# Patient Record
(demographics unavailable — no encounter records)

---

## 2024-10-15 NOTE — HISTORY OF PRESENT ILLNESS
[de-identified] : 79 years old female has past medical history of hypertension, Type 2 Diabetes Mellitus with hyperglycemia, CVA, Mixed Hyperlipidemia, came back to review her most recent blood and urine tests results. Fasting glucose was 173, HBA1c 6.9, ALT 46, vitamin D 23.1, UA positive for protein and trace leukocyte esterase, and epithelial cells, and albumin/creatinine ratio 637. Reports were reviewed with pt in details. Other blood tests came back wnl. Pt has been taking all her medications.  called office in one week ago because pt's home blood pressure went up to 200/110. Pt started losartan 25 mg daily, then advanced to 25 mg, 2 daily, Pt has been taking losartan 25 mg, 2 daily, together with amlodipine 10 mg daily and propranolol 160 mg daily. Home blood pressure improved 147-150/70-80. Pt will take a flight soon, followed by a cruise. Pt requested Rx of alprazolam 0.5 mg daily as needed for anxiety/panic symptoms on the plane and cruise.

## 2024-10-15 NOTE — HISTORY OF PRESENT ILLNESS
[de-identified] : 79 years old female has past medical history of hypertension, Type 2 Diabetes Mellitus with hyperglycemia, CVA, Mixed Hyperlipidemia, came back to review her most recent blood and urine tests results. Fasting glucose was 173, HBA1c 6.9, ALT 46, vitamin D 23.1, UA positive for protein and trace leukocyte esterase, and epithelial cells, and albumin/creatinine ratio 637. Reports were reviewed with pt in details. Other blood tests came back wnl. Pt has been taking all her medications.  called office in one week ago because pt's home blood pressure went up to 200/110. Pt started losartan 25 mg daily, then advanced to 25 mg, 2 daily, Pt has been taking losartan 25 mg, 2 daily, together with amlodipine 10 mg daily and propranolol 160 mg daily. Home blood pressure improved 147-150/70-80. Pt will take a flight soon, followed by a cruise. Pt requested Rx of alprazolam 0.5 mg daily as needed for anxiety/panic symptoms on the plane and cruise.

## 2025-01-07 NOTE — PHYSICAL EXAM
[No Acute Distress] : no acute distress [Normal Sclera/Conjunctiva] : normal sclera/conjunctiva [PERRL] : pupils equal round and reactive to light [EOMI] : extraocular movements intact [Normal Outer Ear/Nose] : the outer ears and nose were normal in appearance [Normal Oropharynx] : the oropharynx was normal [No JVD] : no jugular venous distention [No Lymphadenopathy] : no lymphadenopathy [Supple] : supple [Thyroid Normal, No Nodules] : the thyroid was normal and there were no nodules present [No Respiratory Distress] : no respiratory distress  [No Accessory Muscle Use] : no accessory muscle use [Clear to Auscultation] : lungs were clear to auscultation bilaterally [Normal Rate] : normal rate  [Regular Rhythm] : with a regular rhythm [Normal S1, S2] : normal S1 and S2 [No Murmur] : no murmur heard [No Carotid Bruits] : no carotid bruits [No Varicosities] : no varicosities [Pedal Pulses Present] : the pedal pulses are present [No Edema] : there was no peripheral edema [No Extremity Clubbing/Cyanosis] : no extremity clubbing/cyanosis [Soft] : abdomen soft [Non Tender] : non-tender [Non-distended] : non-distended [No Masses] : no abdominal mass palpated [No HSM] : no HSM [Normal Bowel Sounds] : normal bowel sounds [No CVA Tenderness] : no CVA  tenderness [No Spinal Tenderness] : no spinal tenderness [No Joint Swelling] : no joint swelling [Grossly Normal Strength/Tone] : grossly normal strength/tone [No Rash] : no rash [Coordination Grossly Intact] : coordination grossly intact [No Focal Deficits] : no focal deficits [Normal Gait] : normal gait [Deep Tendon Reflexes (DTR)] : deep tendon reflexes were 2+ and symmetric [Normal Affect] : the affect was normal [Normal Insight/Judgement] : insight and judgment were intact

## 2025-01-07 NOTE — PHYSICAL EXAM
[No Acute Distress] : no acute distress [Normal Sclera/Conjunctiva] : normal sclera/conjunctiva [PERRL] : pupils equal round and reactive to light [EOMI] : extraocular movements intact [Normal Outer Ear/Nose] : the outer ears and nose were normal in appearance [Normal Oropharynx] : the oropharynx was normal [No JVD] : no jugular venous distention [No Lymphadenopathy] : no lymphadenopathy [Supple] : supple [Thyroid Normal, No Nodules] : the thyroid was normal and there were no nodules present [No Accessory Muscle Use] : no accessory muscle use [No Respiratory Distress] : no respiratory distress  [Clear to Auscultation] : lungs were clear to auscultation bilaterally [Normal Rate] : normal rate  [Regular Rhythm] : with a regular rhythm [Normal S1, S2] : normal S1 and S2 [No Murmur] : no murmur heard [No Carotid Bruits] : no carotid bruits [Pedal Pulses Present] : the pedal pulses are present [No Varicosities] : no varicosities [No Edema] : there was no peripheral edema [No Extremity Clubbing/Cyanosis] : no extremity clubbing/cyanosis [Soft] : abdomen soft [Non Tender] : non-tender [Non-distended] : non-distended [No Masses] : no abdominal mass palpated [No HSM] : no HSM [Normal Bowel Sounds] : normal bowel sounds [No CVA Tenderness] : no CVA  tenderness [No Spinal Tenderness] : no spinal tenderness [No Joint Swelling] : no joint swelling [Grossly Normal Strength/Tone] : grossly normal strength/tone [No Rash] : no rash [Coordination Grossly Intact] : coordination grossly intact [No Focal Deficits] : no focal deficits [Normal Gait] : normal gait [Deep Tendon Reflexes (DTR)] : deep tendon reflexes were 2+ and symmetric [Normal Affect] : the affect was normal [Normal Insight/Judgement] : insight and judgment were intact

## 2025-01-14 NOTE — HEALTH RISK ASSESSMENT
[Good] : ~his/her~  mood as  good [Yes] : Yes [No] : In the past 12 months have you used drugs other than those required for medical reasons? No [0] : 2) Feeling down, depressed, or hopeless: Not at all (0) [PHQ-2 Negative - No further assessment needed] : PHQ-2 Negative - No further assessment needed [Former] : Former [> 15 Years] : > 15 Years [No Retinopathy] : No retinopathy [Patient declined mammogram] : Patient declined mammogram [Patient declined PAP Smear] : Patient declined PAP Smear [Patient declined bone density test] : Patient declined bone density test [Patient declined colonoscopy] : Patient declined colonoscopy [With Family] : lives with family [] :  [Fully functional (bathing, dressing, toileting, transferring, walking, feeding)] : Fully functional (bathing, dressing, toileting, transferring, walking, feeding) [Fully functional (using the telephone, shopping, preparing meals, housekeeping, doing laundry, using] : Fully functional and needs no help or supervision to perform IADLs (using the telephone, shopping, preparing meals, housekeeping, doing laundry, using transportation, managing medications and managing finances) [Designated Healthcare Proxy] : Designated healthcare proxy [Relationship: ___] : Relationship: [unfilled] [de-identified] : occasionally [[id number]] : 0 [EyeExamDate] : 9/24

## 2025-01-14 NOTE — HISTORY OF PRESENT ILLNESS
[de-identified] : 79 years old female has past medical history of hypertension, Type 2 Diabetes Mellitus with hyperglycemia, CVA, Mixed Hyperlipidemia, came back for annual physical exam.

## 2025-01-14 NOTE — HEALTH RISK ASSESSMENT
[Good] : ~his/her~  mood as  good [Yes] : Yes [No] : In the past 12 months have you used drugs other than those required for medical reasons? No [0] : 2) Feeling down, depressed, or hopeless: Not at all (0) [PHQ-2 Negative - No further assessment needed] : PHQ-2 Negative - No further assessment needed [Former] : Former [> 15 Years] : > 15 Years [No Retinopathy] : No retinopathy [Patient declined mammogram] : Patient declined mammogram [Patient declined PAP Smear] : Patient declined PAP Smear [Patient declined bone density test] : Patient declined bone density test [Patient declined colonoscopy] : Patient declined colonoscopy [With Family] : lives with family [] :  [Fully functional (bathing, dressing, toileting, transferring, walking, feeding)] : Fully functional (bathing, dressing, toileting, transferring, walking, feeding) [Fully functional (using the telephone, shopping, preparing meals, housekeeping, doing laundry, using] : Fully functional and needs no help or supervision to perform IADLs (using the telephone, shopping, preparing meals, housekeeping, doing laundry, using transportation, managing medications and managing finances) [Designated Healthcare Proxy] : Designated healthcare proxy [Relationship: ___] : Relationship: [unfilled] [de-identified] : occasionally [[id number]] : 0 [EyeExamDate] : 9/24

## 2025-01-14 NOTE — HISTORY OF PRESENT ILLNESS
[de-identified] : 79 years old female has past medical history of hypertension, Type 2 Diabetes Mellitus with hyperglycemia, CVA, Mixed Hyperlipidemia, came back for annual physical exam.

## 2025-01-30 NOTE — HISTORY OF PRESENT ILLNESS
[de-identified] : 79 years old female has past medical history of hypertension, Type 2 Diabetes Mellitus with hyperglycemia, CVA, Mixed Hyperlipidemia, came back to review her most recent test results. Fasting glucose was 174, HBA1c 6.8, AST 46, ALT 46, GFR 55, , HDL 46, , UA positive for trace leukocyte esterase, and epithelial cells, and albumin/creatinine ratio 149. Reports were reviewed with pt in details. Other blood tests came back wnl. Pt has been taking all her medications.

## 2025-01-30 NOTE — HISTORY OF PRESENT ILLNESS
[de-identified] : 79 years old female has past medical history of hypertension, Type 2 Diabetes Mellitus with hyperglycemia, CVA, Mixed Hyperlipidemia, came back to review her most recent test results. Fasting glucose was 174, HBA1c 6.8, AST 46, ALT 46, GFR 55, , HDL 46, , UA positive for trace leukocyte esterase, and epithelial cells, and albumin/creatinine ratio 149. Reports were reviewed with pt in details. Other blood tests came back wnl. Pt has been taking all her medications.

## 2025-04-09 NOTE — RESULTS/DATA
[FreeTextEntry1] : EXAM: [id number] - US DPLX CAROTIDS COMPL BI  - ORDERED BY: STEPHANIE FLORES PROCEDURE DATE:  04/01/2025  INTERPRETATION:  CLINICAL INFORMATION: Carotid artery stenosis  COMPARISON: Carotid duplex ultrasound 9/24/2024  TECHNIQUE: Grayscale, color and spectral Doppler examination of both carotid arteries was performed.  FINDINGS:  Scattered right carotid artery plaque. Right-sided carotid artery stent is patent, extending from distal right common carotid artery to mid/distal right internal carotid artery. No hemodynamically significant right internal carotid artery stenosis. Elevated velocity of right external carotid artery may reflect significant stenosis.  Left carotid artery scattered intimal thickening and minimal to mild plaque without hemodynamically significant stenosis.  Peak systolic velocities are as follows:  RIGHT: PROX CCA = 67 cm/s MID CCA = 78 cm/s DIST CCA = 80 cm/s (prior to stent) PROX ICA = 119 cm/s (within stent) MID ICA = 98 cm/s (within stent) DIST ICA = 95 cm/s (just beyond stent) ECA = 167 cm/s  LEFT: PROX CCA = 80 cm/s MID CCA = 74 cm/s DIST CCA = 69 cm/s PROX ICA = 65 cm/s MID ICA = 74 cm/s DIST ICA = 83 cm/s ECA = 131 cm/s  Antegrade flow is noted within both vertebral arteries.  IMPRESSION:  Right-sided carotid artery stent is patent. No hemodynamically significant right internal carotid artery stenosis. Elevated velocity of right external carotid artery may reflect significant stenosis.  Left carotid artery plaque without hemodynamically significant stenosis.  Measurement of carotid stenosis is based on updated recommendations for carotid stenosis interpretation criteria from the Intersocietal Accreditation Commission (IAC) Vascular Testing Board, modified from the Society of Radiologists in Ultrasound (SRU) Consensus Conference Criteria for Internal Carotid Artery Stenosis.  --- End of Report ---  MARIE RODRIGUEZ M.D., ATTENDING RADIOLOGIST This document has been electronically signed. Apr 1 2025  2:30PM

## 2025-04-09 NOTE — ASSESSMENT
[FreeTextEntry1] : IMPRESSION: 80 F with PMH of HTN, HLD, DM, cataract surgery 13 years ago, p/w transient binocular blurry vision x1 month on 3/3/24. Found to have acute R occipital infarct, punctate R frontal infarct. CTA head demonstrated severe 70% stenosis at R carotid bifurcation and severe stenosis v. hypoplastic P1 segment of right PCA. Planned for carotid revascularization but delayed due to complicated diverticulitis with abscess. Returned 3/28/24 with acute onset L hand weakness/numbness. MRI showed acute on chronic infarct involving R parieto-occipital brain, numerous acute but more punctate cortical infarcts scattered at frontoparietal locations. s/p R carotid angioplasty and stenting 3/29/24 for symptomatic severe 72% R carotid stenosis. Angio also demonstrated stenosis within P1 segment of R PCA, and moderate stenosis at the origin of the R vert artery. On ASA 81 and Brilinta 60 BID (not responding to plavix by PRU). Abdominal drain was removed by IR prior to discharge.  Repeat carotid dopplers 9/24/24 shows patent right carotid stent. No sig stenosis of either carotid artery. Brilinta stopped after this visit, ASA continued.   Repeat Carotid dopplers 4/1/25 stable shows patient Right carotid stent.  Patient continues to do well with no complaints today.    PLAN: Continue aspirin 81mg daily indefinitely US carotid doppler - 1 year  Follow up after for review Follow up with PCP/cardiology for BP management - follows PCP Dr. Sanderson

## 2025-04-09 NOTE — PHYSICAL EXAM
[General Appearance - Alert] : alert [General Appearance - In No Acute Distress] : in no acute distress [Oriented To Time, Place, And Person] : oriented to person, place, and time [Person] : oriented to person [Place] : oriented to place [Time] : oriented to time [Sclera] : the sclera and conjunctiva were normal [Hearing Threshold Finger Rub Not [place]] : hearing was normal [Neck Appearance] : the appearance of the neck was normal [] : no respiratory distress [Abnormal Walk] : normal gait [Involuntary Movements] : no involuntary movements were seen [Motor Tone] : muscle strength and tone were normal [Skin Color & Pigmentation] : normal skin color and pigmentation [FreeTextEntry8] : uses wheelchair for assistance

## 2025-04-09 NOTE — PHYSICAL EXAM
[General Appearance - Alert] : alert [General Appearance - In No Acute Distress] : in no acute distress [Oriented To Time, Place, And Person] : oriented to person, place, and time [Person] : oriented to person [Place] : oriented to place [Time] : oriented to time [Sclera] : the sclera and conjunctiva were normal [Hearing Threshold Finger Rub Not [place]] : hearing was normal [Neck Appearance] : the appearance of the neck was normal [] : no respiratory distress [Abnormal Walk] : normal gait [Involuntary Movements] : no involuntary movements were seen [Motor Tone] : muscle strength and tone were normal [Skin Color & Pigmentation] : normal skin color and pigmentation [FreeTextEntry8] : uses wheelchair for assistance

## 2025-04-09 NOTE — HISTORY OF PRESENT ILLNESS
[FreeTextEntry1] : KEILY PEGUERO is an 80 year old female with PMH of HTN, HLD, DM, cataract surgery in 2011, who was transferred from [facility] to [facility] on 3/3/24 with binocular blurry vision for 1 month. Found to have acute right occipital infarct as well as a punctate right frontal infarct. CTA of the head/neck demonstrated severe 70% stenosis at the right carotid bifurcation and severe stenosis vs. hypoplastic P1 segment of the right PCA. She was discharged on aspirin 81mg daily and Plavix and plan was for short interval follow up for right carotid revascularization. Unfortunately, this was delayed when the patient presented to [facility] on 3/13/24 for complicated diverticulitis with abscess and underwent drain placement in IR. ([facility] admission MRN: [id number]). Patient instructed to follow up with general surgery, Dr. Johnston, who she saw on 3/27/24. On 3/28/24, she was readmitted and presented with acute onset left hand weakness and numbness upon awaking. CTA showed severe stenosis of the right proximal internal carotid. MRI showed acute-on-chronic infarct involves right parietooccipital brain, and there are numerous acute but more punctate cortical infarcts scattered at frontoparietal locations. On 3/29/24, she underwent successful right carotid angioplasty and stenting for symptomatic, severe carotid stenosis. Angio demonstrated 72% severe stenosis of right ICA, stenosis within the P1 segment of the right PCA, and moderate stenosis at the origin of the right vertebral artery. Found to be non-therapeutic on Plavix and was transitioned to Brilinta. ID following for perforated diverticulitis and abscess. During hospital course, patient with one isolated episode of dysarthria and L hand weakness, CTH/CTA negative. EEG negative. Discharged 4/1/24 on aspirin 81mg daily and Brilinta 60mg BID.  4/9/25: pt arrives for 1 year f/u doing well, no complaints. US carotids of 4/1/25 for review in pacs/nw - stable , no changes Right-sided carotid artery stent is patent. No hemodynamically significant right internal carotid artery stenosis. Elevated velocity of right external carotid artery may reflect significant stenosis. Left carotid artery plaque without hemodynamically significant stenosis.  10/2/24: Today, patient presents for her 6 month follow up post-carotid stenting. She underwent repeat carotid dopplers on 9/24/24 and is here to review the results. Compliant with aspirin and Brilinta. Patient and  state she has lingering intermittent left sided symptoms from the initial stroke.

## 2025-04-09 NOTE — REASON FOR VISIT
[FreeTextEntry1] : s/p Right carotid angioplasty and stenting 3/29/24 Reviewed Bilateral carotid doppler ultrasound done 9/24/24 Reviewed Bilateral carotid doppler US of 4/1/25 in Pacs/nw

## 2025-04-09 NOTE — RESULTS/DATA
[FreeTextEntry1] : EXAM: [id number] - US DPLX CAROTIDS COMPL BI  - ORDERED BY: STEPHANIE FLORES PROCEDURE DATE:  04/01/2025  INTERPRETATION:  CLINICAL INFORMATION: Carotid artery stenosis  COMPARISON: Carotid duplex ultrasound 9/24/2024  TECHNIQUE: Grayscale, color and spectral Doppler examination of both carotid arteries was performed.  FINDINGS:  Scattered right carotid artery plaque. Right-sided carotid artery stent is patent, extending from distal right common carotid artery to mid/distal right internal carotid artery. No hemodynamically significant right internal carotid artery stenosis. Elevated velocity of right external carotid artery may reflect significant stenosis.  Left carotid artery scattered intimal thickening and minimal to mild plaque without hemodynamically significant stenosis.  Peak systolic velocities are as follows:  RIGHT: PROX CCA = 67 cm/s MID CCA = 78 cm/s DIST CCA = 80 cm/s (prior to stent) PROX ICA = 119 cm/s (within stent) MID ICA = 98 cm/s (within stent) DIST ICA = 95 cm/s (just beyond stent) ECA = 167 cm/s  LEFT: PROX CCA = 80 cm/s MID CCA = 74 cm/s DIST CCA = 69 cm/s PROX ICA = 65 cm/s MID ICA = 74 cm/s DIST ICA = 83 cm/s ECA = 131 cm/s  Antegrade flow is noted within both vertebral arteries.  IMPRESSION:  Right-sided carotid artery stent is patent. No hemodynamically significant right internal carotid artery stenosis. Elevated velocity of right external carotid artery may reflect significant stenosis.  Left carotid artery plaque without hemodynamically significant stenosis.  Measurement of carotid stenosis is based on updated recommendations for carotid stenosis interpretation criteria from the Intersocietal Accreditation Commission (IAC) Vascular Testing Board, modified from the Society of Radiologists in Ultrasound (SRU) Consensus Conference Criteria for Internal Carotid Artery Stenosis.  --- End of Report ---  MARIE RODRIGUEZ M.D., ATTENDING RADIOLOGIST This document has been electronically signed. Apr 1 2025  2:30PM

## 2025-04-09 NOTE — HISTORY OF PRESENT ILLNESS
[FreeTextEntry1] : KEILY PEGUERO is an 80 year old female with PMH of HTN, HLD, DM, cataract surgery in 2011, who was transferred from [facility] to [facility] on 3/3/24 with binocular blurry vision for 1 month. Found to have acute right occipital infarct as well as a punctate right frontal infarct. CTA of the head/neck demonstrated severe 70% stenosis at the right carotid bifurcation and severe stenosis vs. hypoplastic P1 segment of the right PCA. She was discharged on aspirin 81mg daily and Plavix and plan was for short interval follow up for right carotid revascularization. Unfortunately, this was delayed when the patient presented to [facility] on 3/13/24 for complicated diverticulitis with abscess and underwent drain placement in IR. ([facility] admission MRN: [id number]). Patient instructed to follow up with general surgery, Dr. Johnston, who she saw on 3/27/24. On 3/28/24, she was readmitted and presented with acute onset left hand weakness and numbness upon awaking. CTA showed severe stenosis of the right proximal internal carotid. MRI showed acute-on-chronic infarct involves right parietooccipital brain, and there are numerous acute but more punctate cortical infarcts scattered at frontoparietal locations. On 3/29/24, she underwent successful right carotid angioplasty and stenting for symptomatic, severe carotid stenosis. Angio demonstrated 72% severe stenosis of right ICA, stenosis within the P1 segment of the right PCA, and moderate stenosis at the origin of the right vertebral artery. Found to be non-therapeutic on Plavix and was transitioned to Brilinta. ID following for perforated diverticulitis and abscess. During hospital course, patient with one isolated episode of dysarthria and L hand weakness, CTH/CTA negative. EEG negative. Discharged 4/1/24 on aspirin 81mg daily and Brilinta 60mg BID.  4/9/25: pt arrives for 1 year f/u doing well, no complaints. US carotids of 4/1/25 for review in pacs/nw - stable , no changes Right-sided carotid artery stent is patent. No hemodynamically significant right internal carotid artery stenosis. Elevated velocity of right external carotid artery may reflect significant stenosis. Left carotid artery plaque without hemodynamically significant stenosis.  10/2/24: Today, patient presents for her 6 month follow up post-carotid stenting. She underwent repeat carotid dopplers on 9/24/24 and is here to review the results. Compliant with aspirin and Brilinta. Patient and  state she has lingering intermittent left sided symptoms from the initial stroke.

## 2025-04-30 NOTE — HISTORY OF PRESENT ILLNESS
[de-identified] : 79 years old female has past medical history of hypertension, Type 2 Diabetes Mellitus with hyperglycemia, right occipital and frontal infarct, right carotid stenosis, R PCA stenosis, right vert artery stenosis, Mixed Hyperlipidemia, came back for follow up. Pt had right carotid stent placed. Brilenta discontinued after carotid dopplers on 9/24/24 showed patent right carotid stent. Pt saw cardiologist in early this month. Repeated carotid dopplers in 4/1/25 showed stable patent right carotid stent. Pt takes aspirin indefinitely. Pt will have carotid dopplers in 1 year.

## 2025-04-30 NOTE — PHYSICAL EXAM
[Normal] : normal gait, coordination grossly intact, no focal deficits and deep tendon reflexes were 2+ and symmetric [de-identified] : Motor: 5/5 bilaterally

## 2025-04-30 NOTE — PHYSICAL EXAM
[Normal] : normal gait, coordination grossly intact, no focal deficits and deep tendon reflexes were 2+ and symmetric [de-identified] : Motor: 5/5 bilaterally

## 2025-04-30 NOTE — HISTORY OF PRESENT ILLNESS
[de-identified] : 79 years old female has past medical history of hypertension, Type 2 Diabetes Mellitus with hyperglycemia, right occipital and frontal infarct, right carotid stenosis, R PCA stenosis, right vert artery stenosis, Mixed Hyperlipidemia, came back for follow up. Pt had right carotid stent placed. Brilenta discontinued after carotid dopplers on 9/24/24 showed patent right carotid stent. Pt saw cardiologist in early this month. Repeated carotid dopplers in 4/1/25 showed stable patent right carotid stent. Pt takes aspirin indefinitely. Pt will have carotid dopplers in 1 year.

## 2025-05-07 NOTE — HISTORY OF PRESENT ILLNESS
[de-identified] : 79 years old female with past medical history of hypertension, Type 2 Diabetes Mellitus with hyperglycemia, right occipital and frontal infarct, right carotid stenosis, R PCA stenosis, right vert artery stenosis, Mixed Hyperlipidemia, came back to review her most recent test results.  Fasting glucose was 185, HBA1c 7.9, AST 57, ALT 64, GFR 53, albumin/creatinine ratio 237, UA positive for protein 100, trace leukocyte esterase, and epithelial cells. Reports were reviewed with pt in details. Other blood tests came back wnl.